# Patient Record
(demographics unavailable — no encounter records)

---

## 2025-03-13 NOTE — HISTORY OF PRESENT ILLNESS
[de-identified] : Ms. AUBREY HUFFMAN is a 55 year old woman presents today for initial evaluation of a left shoulder injury sustained on 3/1/25 after a fall onto her left arm. She was seen at [place] where x-rays were performed. She presents today in a splint.  She initially had function of her fingers and wrist after the fracture but there was an incident where somebody in the hospital pulled on her splint which came off and her arm flopped down and after this she no longer had function of her radial nerve.  Of note, she is s/p left subdural hematoma evacuation on 5/16/24 by Dr. Whitley with residual left sided weakness

## 2025-03-13 NOTE — HISTORY OF PRESENT ILLNESS
[de-identified] : Ms. AUBREY HUFFMAN is a 55 year old woman presents today for initial evaluation of a left shoulder injury sustained on 3/1/25 after a fall onto her left arm. She was seen at [facility] where x-rays were performed. She presents today in a splint.  She initially had function of her fingers and wrist after the fracture but there was an incident where somebody in the hospital pulled on her splint which came off and her arm flopped down and after this she no longer had function of her radial nerve.  Of note, she is s/p left subdural hematoma evacuation on 5/16/24 by Dr. Whitley with residual left sided weakness

## 2025-03-13 NOTE — PHYSICAL EXAM
[de-identified] : Ms. AUBREY HUFFMAN is sitting comfortably in the exam room  LEFT shoulder  -Splint intact, clean and dry -Able to make a fist -Able to flex fingers -Unable to extend fingers/thumb -Unable to extend wrist -Sensation grossly intact in the fingers except for the radial nerve distribution -Fingers warm and well-perfused  [de-identified] :  X-rays of the left  humerus were taken in the office today, 3/13/25. AP and Lateral.  X-rays show a distal humerus fracture from the distal third shaft extending into the metaphyseal region.  The patient is also status post open reduction internal fixation of the proximal humerus with a plate and screws.   X-rays of the left elbow were taken in the office today, 3/13/25. AP, Oblique and Lateral  X-rays show a distal humerus fracture from the distal third shaft extending into the metaphyseal region

## 2025-03-13 NOTE — DISCUSSION/SUMMARY
[de-identified] : 55-year-old woman with a left distal humerus shaft fracture with radial nerve palsy, approximately 12 days out  -The risks and benefits of operative versus nonoperative management were discussed at length with the patient. The patient shows a good understanding of the injury and treatment options. They would like to move forward with operative management -NWB left UE in splint  -Keep splint clean and dry -Schedule surgery for 3/14/25 -Follow-up in the office 2 weeks after surgery  -All of the patient's questions and concerns were addressed.

## 2025-03-13 NOTE — PHYSICAL EXAM
[de-identified] : Ms. AUBREY HUFFMAN is sitting comfortably in the exam room  LEFT shoulder  -Splint intact, clean and dry -Able to make a fist -Able to flex fingers -Unable to extend fingers/thumb -Unable to extend wrist -Sensation grossly intact in the fingers except for the radial nerve distribution -Fingers warm and well-perfused  [de-identified] :  X-rays of the left  humerus were taken in the office today, 3/13/25. AP and Lateral.  X-rays show a distal humerus fracture from the distal third shaft extending into the metaphyseal region.  The patient is also status post open reduction internal fixation of the proximal humerus with a plate and screws.   X-rays of the left elbow were taken in the office today, 3/13/25. AP, Oblique and Lateral  X-rays show a distal humerus fracture from the distal third shaft extending into the metaphyseal region

## 2025-03-27 NOTE — DISCUSSION/SUMMARY
[de-identified] : 55-year-old woman s/p ORIF left humerus, left radial nerve exploration, neurolysis and nerve repair with nerve wrap, approximately 2 weeks out.  -X-ray and physical exam findings were discussed with the patient -NWB left UE in removable wrist brace -Removable wrist brace prescribed -Physical therapy: work on ROM of the left shoulder and elbow  -Follow up in 4 weeks with x-rays of the left humerus at that time. -All the patient's questions and concerns were addressed during this visit

## 2025-03-27 NOTE — PHYSICAL EXAM
[de-identified] : The patient is sitting comfortably in the exam room.  LEFT arm. -Skin is intact, no swelling, no ecchymosis -Incision is clean and dry, no erythema, no signs of infection -No tenderness to palpation medially or laterally -No pain with palpation over the radial head with range of motion -Range of motion elbow 5-100 -Pronation: 90, supination: 90 -Stable to varus and valgus stress -Able to make a fist -Sensation is intact median, ulnar, axillary nerves -Unable to extend the wrist and extend the fingers.  Decreased sensation in the radial nerve distribution. -Motor is intact median, ulnar, axillary nerves -Hand is warm and well-perfused, Palpable radial and ulnar pulses  [de-identified] : No Xrays were taken in the office today, 3/27/25

## 2025-03-27 NOTE — REASON FOR VISIT
[Post Operative Visit] : a post operative visit for [FreeTextEntry2] : s/p ORIF left humerus, left radial nerve exploration, neurolysis and nerve repair with nerve wrap, DOS: 3/14/25

## 2025-03-27 NOTE — PHYSICAL EXAM
[de-identified] : The patient is sitting comfortably in the exam room.  LEFT arm. -Skin is intact, no swelling, no ecchymosis -Incision is clean and dry, no erythema, no signs of infection -No tenderness to palpation medially or laterally -No pain with palpation over the radial head with range of motion -Range of motion elbow 5-100 -Pronation: 90, supination: 90 -Stable to varus and valgus stress -Able to make a fist -Sensation is intact median, ulnar, axillary nerves -Unable to extend the wrist and extend the fingers.  Decreased sensation in the radial nerve distribution. -Motor is intact median, ulnar, axillary nerves -Hand is warm and well-perfused, Palpable radial and ulnar pulses  [de-identified] : No Xrays were taken in the office today, 3/27/25

## 2025-03-27 NOTE — DISCUSSION/SUMMARY
[de-identified] : 55-year-old woman s/p ORIF left humerus, left radial nerve exploration, neurolysis and nerve repair with nerve wrap, approximately 2 weeks out.  -X-ray and physical exam findings were discussed with the patient -NWB left UE in removable wrist brace -Removable wrist brace prescribed -Physical therapy: work on ROM of the left shoulder and elbow  -Follow up in 4 weeks with x-rays of the left humerus at that time. -All the patient's questions and concerns were addressed during this visit

## 2025-03-27 NOTE — HISTORY OF PRESENT ILLNESS
[de-identified] : Ms. AUBREY HUFFMAN is a 55 year old woman presents today for post-op appointment s/p ORIF left humerus, left radial nerve exploration, neurolysis and nerve repair with nerve wrap on 3/14/25. Since her surgery she states she is feeling better.  She had a radial nerve palsy preoperatively.  She still is unable to extend her fingers and wrist but she reports the feeling in certain parts of her hand has improved.  She is currently in a rehab facility and has been working in range of motion of her shoulder. She is taking Oxycodone and Tylenol for the pain with moderate relief.

## 2025-03-27 NOTE — HISTORY OF PRESENT ILLNESS
[de-identified] : Ms. AUBREY HUFFMAN is a 55 year old woman presents today for post-op appointment s/p ORIF left humerus, left radial nerve exploration, neurolysis and nerve repair with nerve wrap on 3/14/25. Since her surgery she states she is feeling better.  She had a radial nerve palsy preoperatively.  She still is unable to extend her fingers and wrist but she reports the feeling in certain parts of her hand has improved.  She is currently in a rehab facility and has been working in range of motion of her shoulder. She is taking Oxycodone and Tylenol for the pain with moderate relief.

## 2025-04-30 NOTE — DISCUSSION/SUMMARY
[de-identified] : 55-year-old woman s/p ORIF left humerus, left radial nerve exploration, neurolysis and nerve repair with nerve wrap, nearly 7 weeks out.  -X-ray and physical exam findings were discussed with the patient -5lb weight limit left UE -Physical therapy: work on ROM of the left shoulder and elbow  -Follow up in 2 months with x-rays of the left humerus at that time. -All the patient's questions and concerns were addressed during this visit

## 2025-04-30 NOTE — HISTORY OF PRESENT ILLNESS
[de-identified] : Ms. AUBREY HUFFMAN is a 55 year old woman presents today for post-op appointment s/p ORIF left humerus, left radial nerve exploration, neurolysis and nerve repair with nerve wrap on 3/14/25. Since her last visit she states she is feeling

## 2025-04-30 NOTE — PHYSICAL EXAM
[de-identified] : The patient is sitting comfortably in the exam room.  LEFT arm. -Skin is intact, no swelling, no ecchymosis -Incision is well-healed, no erythema, no signs of infection -No tenderness to palpation medially or laterally -No pain with palpation over the radial head with range of motion -Range of motion elbow 5-100 -Pronation: 90, supination: 90 -Stable to varus and valgus stress -Able to make a fist -Sensation is intact median, ulnar, axillary nerves -Unable to extend the wrist and extend the fingers.  Decreased sensation in the radial nerve distribution. -Motor is intact median, ulnar, axillary nerves -Hand is warm and well-perfused, Palpable radial and ulnar pulses  [de-identified] :  X-rays of the left humerus were taken in the office today, 5/1/25. AP and Lateral.

## 2025-06-04 NOTE — REASON FOR VISIT
[Follow-Up: _____] : a [unfilled] follow-up visit [FreeTextEntry1] : Ms. Cano is a 55-year-old right-handed woman residing at [facility] with a past medical history of Alcohol Use Disorder and acute-on-chronic left subdural hematoma (SDH), status post surgical evacuation on 5/16/2024. She was found unresponsive and hypoxic on the day of admission, and imaging revealed a large left-sided SDH measuring 2 cm in maximal width, with 1.3 cm rightward midline shift, subfalcine and transtentorial herniation. She underwent emergent surgical evacuation. 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  Today, she presents for follow-up. The patient reports that her left-sided weakness has been improving with physical and speech therapy. However, she describes having developed right-sided twitching movements over the past few months, which progressively worsened and ultimately caused a fall that led to a left humeral fracture with associated nerve injury. As a result, she now has significantly reduced mobility of her left upper extremity. EEG was performed to evaluate the twitching episodes, which were not found to be consistent with seizures. She had been on valproate but discontinued it one week ago, and symptoms have reportedly improved since. She is currently ambulatory with the use of a walker.

## 2025-06-04 NOTE — REASON FOR VISIT
[Follow-Up: _____] : a [unfilled] follow-up visit [FreeTextEntry1] : Ms. Cano is a 55-year-old right-handed woman residing at [facility] with a past medical history of Alcohol Use Disorder and acute-on-chronic left subdural hematoma (SDH), status post surgical evacuation on 5/16/2024. She was found unresponsive and hypoxic on the day of admission, and imaging revealed a large left-sided SDH measuring 2 cm in maximal width, with 1.3 cm rightward midline shift, subfalcine and transtentorial herniation. She underwent emergent surgical evacuation. 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  Today, she presents for follow-up. The patient reports that her left-sided weakness has been improving with physical and speech therapy. However, she describes having developed right-sided twitching movements over the past few months, which progressively worsened and ultimately caused a fall that led to a left humeral fracture with associated nerve injury. As a result, she now has significantly reduced mobility of her left upper extremity. EEG was performed to evaluate the twitching episodes, which were not found to be consistent with seizures. She had been on valproate but discontinued it one week ago, and symptoms have reportedly improved since. She is currently ambulatory with the use of a walker.

## 2025-06-04 NOTE — ASSESSMENT
[FreeTextEntry1] :  55-year-old woman with history of left-sided SDH with postoperative left hemiparesis and recent fall resulting in left humerus fracture. Right-sided twitching not consistent with seizures per EEG. Functional status is improving with therapy.  PLAN: - Physical therapy for 4 weeks - Non-contrast CT head

## 2025-06-10 NOTE — DISCUSSION/SUMMARY
[de-identified] : 55-year-old woman s/p ORIF left humerus, left radial nerve exploration, neurolysis and nerve repair with nerve wrap, nearly 12 weeks out.  -X-ray and physical exam findings were discussed with the patient -WBAT left UE -Physical therapy: work on ROM of the left shoulder and elbow  -Follow up in 3 months with x-rays of the left humerus at that time. If the wrist drop is still present, we will send her for an EMG -All the patient's questions and concerns were addressed during this visit

## 2025-06-10 NOTE — PHYSICAL EXAM
[de-identified] : The patient is sitting comfortably in the exam room.  LEFT arm. -Skin is intact, no swelling, no ecchymosis -Incision is well-healed, no erythema, no signs of infection -No tenderness to palpation medially or laterally -No pain with palpation over the radial head with range of motion -Range of motion elbow 5-100 -Pronation: 90, supination: 90 -Stable to varus and valgus stress -Able to make a fist -Sensation is intact median, ulnar, axillary nerves -Unable to extend the wrist and extend the fingers.  Decreased sensation in the radial nerve distribution. -Motor is intact median, ulnar, axillary nerves -Hand is warm and well-perfused, Palpable radial and ulnar pulses  [de-identified] :  X-rays of the left humerus were taken in the office today, 6/5/25. AP and Lateral.  X-rays show good overall alignment of the humerus.  The patient status post open reduction internal fixation of both the proximal humerus and distal humeral shaft.  No loss of reduction of either fracture.  Implants are in good position.  There is some interval healing at the most recent humeral shaft fracture

## 2025-06-10 NOTE — PHYSICAL EXAM
[de-identified] : The patient is sitting comfortably in the exam room.  LEFT arm. -Skin is intact, no swelling, no ecchymosis -Incision is well-healed, no erythema, no signs of infection -No tenderness to palpation medially or laterally -No pain with palpation over the radial head with range of motion -Range of motion elbow 5-100 -Pronation: 90, supination: 90 -Stable to varus and valgus stress -Able to make a fist -Sensation is intact median, ulnar, axillary nerves -Unable to extend the wrist and extend the fingers.  Decreased sensation in the radial nerve distribution. -Motor is intact median, ulnar, axillary nerves -Hand is warm and well-perfused, Palpable radial and ulnar pulses  [de-identified] :  X-rays of the left humerus were taken in the office today, 6/5/25. AP and Lateral.  X-rays show good overall alignment of the humerus.  The patient status post open reduction internal fixation of both the proximal humerus and distal humeral shaft.  No loss of reduction of either fracture.  Implants are in good position.  There is some interval healing at the most recent humeral shaft fracture

## 2025-06-10 NOTE — DISCUSSION/SUMMARY
[de-identified] : 55-year-old woman s/p ORIF left humerus, left radial nerve exploration, neurolysis and nerve repair with nerve wrap, nearly 12 weeks out.  -X-ray and physical exam findings were discussed with the patient -WBAT left UE -Physical therapy: work on ROM of the left shoulder and elbow  -Follow up in 3 months with x-rays of the left humerus at that time. If the wrist drop is still present, we will send her for an EMG -All the patient's questions and concerns were addressed during this visit

## 2025-06-10 NOTE — DISCUSSION/SUMMARY
[de-identified] : 55-year-old woman s/p ORIF left humerus, left radial nerve exploration, neurolysis and nerve repair with nerve wrap, nearly 12 weeks out.  -X-ray and physical exam findings were discussed with the patient -WBAT left UE -Physical therapy: work on ROM of the left shoulder and elbow  -Follow up in 3 months with x-rays of the left humerus at that time. If the wrist drop is still present, we will send her for an EMG -All the patient's questions and concerns were addressed during this visit

## 2025-06-10 NOTE — REASON FOR VISIT
[FreeTextEntry2] : s/p ORIF left humerus, left radial nerve exploration, neurolysis and nerve repair with nerve wrap, DOS: 3/14/25

## 2025-06-10 NOTE — PHYSICAL EXAM
Due for BMP, A1c, lipid panel, liver panel.  Orders in   [de-identified] : The patient is sitting comfortably in the exam room.  LEFT arm. -Skin is intact, no swelling, no ecchymosis -Incision is well-healed, no erythema, no signs of infection -No tenderness to palpation medially or laterally -No pain with palpation over the radial head with range of motion -Range of motion elbow 5-100 -Pronation: 90, supination: 90 -Stable to varus and valgus stress -Able to make a fist -Sensation is intact median, ulnar, axillary nerves -Unable to extend the wrist and extend the fingers.  Decreased sensation in the radial nerve distribution. -Motor is intact median, ulnar, axillary nerves -Hand is warm and well-perfused, Palpable radial and ulnar pulses  [de-identified] :  X-rays of the left humerus were taken in the office today, 6/5/25. AP and Lateral.  X-rays show good overall alignment of the humerus.  The patient status post open reduction internal fixation of both the proximal humerus and distal humeral shaft.  No loss of reduction of either fracture.  Implants are in good position.  There is some interval healing at the most recent humeral shaft fracture

## 2025-06-10 NOTE — HISTORY OF PRESENT ILLNESS
[de-identified] : Ms. AUBREY HUFFMAN is a 55 year old woman presents today for post-op appointment s/p ORIF left humerus, left radial nerve exploration, neurolysis and nerve repair with nerve wrap on 3/14/25. Since her last visit she states she is feeling better. She is currently at a rehab facility and has been working with OT. She still has the wrist drop that was present prior to surgery.  Of note she initially did not have a wrist drop after the fracture but she had a second fall and while getting helped up from the fall the arm was twisted and she developed a wrist drop and radial nerve palsy.  So the palsy was present upon her initial visit to my office.

## 2025-06-10 NOTE — HISTORY OF PRESENT ILLNESS
[de-identified] : Ms. AUBREY HUFFMAN is a 55 year old woman presents today for post-op appointment s/p ORIF left humerus, left radial nerve exploration, neurolysis and nerve repair with nerve wrap on 3/14/25. Since her last visit she states she is feeling better. She is currently at a rehab facility and has been working with OT. She still has the wrist drop that was present prior to surgery.  Of note she initially did not have a wrist drop after the fracture but she had a second fall and while getting helped up from the fall the arm was twisted and she developed a wrist drop and radial nerve palsy.  So the palsy was present upon her initial visit to my office.